# Patient Record
Sex: MALE | Race: BLACK OR AFRICAN AMERICAN | URBAN - METROPOLITAN AREA
[De-identification: names, ages, dates, MRNs, and addresses within clinical notes are randomized per-mention and may not be internally consistent; named-entity substitution may affect disease eponyms.]

---
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
## 2025-01-06 ENCOUNTER — OFFICE VISIT (OUTPATIENT)
Dept: URBAN - METROPOLITAN AREA CLINIC 56 | Facility: LOCATION | Age: 20
End: 2025-01-06
Payer: COMMERCIAL

## 2025-01-06 DIAGNOSIS — H53.143 BILATERAL PHOTOPHOBIA: ICD-10-CM

## 2025-01-06 DIAGNOSIS — M08.00 JUVENILE RHEUMATOID ARTHRITIS: Primary | ICD-10-CM

## 2025-01-06 PROCEDURE — 92133 CPTRZD OPH DX IMG PST SGM ON: CPT | Performed by: OPTOMETRIST

## 2025-01-06 PROCEDURE — 92004 COMPRE OPH EXAM NEW PT 1/>: CPT | Performed by: OPTOMETRIST

## 2025-01-06 ASSESSMENT — VISUAL ACUITY
OS: 20/20
OD: 20/20

## 2025-01-06 ASSESSMENT — INTRAOCULAR PRESSURE
OD: 12
OS: 12

## 2025-01-06 ASSESSMENT — KERATOMETRY
OD: 42.23
OS: 42.41